# Patient Record
Sex: FEMALE | Race: WHITE | URBAN - METROPOLITAN AREA
[De-identification: names, ages, dates, MRNs, and addresses within clinical notes are randomized per-mention and may not be internally consistent; named-entity substitution may affect disease eponyms.]

---

## 2021-05-27 ENCOUNTER — TRANSCRIBE ORDER (OUTPATIENT)
Dept: SCHEDULING | Age: 36
End: 2021-05-27

## 2021-05-27 DIAGNOSIS — M75.102 LEFT ROTATOR CUFF TEAR: ICD-10-CM

## 2021-05-27 DIAGNOSIS — M25.512 LEFT SHOULDER PAIN: Primary | ICD-10-CM

## 2025-02-23 ENCOUNTER — HOSPITAL ENCOUNTER (EMERGENCY)
Facility: HOSPITAL | Age: 40
Discharge: HOME | End: 2025-02-23
Attending: EMERGENCY MEDICINE
Payer: COMMERCIAL

## 2025-02-23 VITALS
HEART RATE: 105 BPM | BODY MASS INDEX: 39.68 KG/M2 | OXYGEN SATURATION: 96 % | TEMPERATURE: 98.7 F | WEIGHT: 293 LBS | SYSTOLIC BLOOD PRESSURE: 125 MMHG | RESPIRATION RATE: 20 BRPM | HEIGHT: 72 IN | DIASTOLIC BLOOD PRESSURE: 87 MMHG

## 2025-02-23 DIAGNOSIS — J10.1 INFLUENZA A: Primary | ICD-10-CM

## 2025-02-23 DIAGNOSIS — J06.9 URI WITH COUGH AND CONGESTION: ICD-10-CM

## 2025-02-23 DIAGNOSIS — B97.89 VIRAL MYALGIA: ICD-10-CM

## 2025-02-23 DIAGNOSIS — M79.10 VIRAL MYALGIA: ICD-10-CM

## 2025-02-23 LAB
FLUAV RNA SPEC QL NAA+PROBE: DETECTED
FLUBV RNA SPEC QL NAA+PROBE: NOT DETECTED
SARS-COV-2 RNA RESP QL NAA+PROBE: NOT DETECTED
SOURCE: ABNORMAL

## 2025-02-23 PROCEDURE — 99283 EMERGENCY DEPT VISIT LOW MDM: CPT

## 2025-02-23 PROCEDURE — 87636 SARSCOV2 & INF A&B AMP PRB: CPT

## 2025-02-23 RX ORDER — OXYMETAZOLINE HYDROCHLORIDE 0.05 G/100ML
2 SPRAY NASAL 2 TIMES DAILY
Qty: 20 ML | Refills: 0 | Status: SHIPPED | OUTPATIENT
Start: 2025-02-23 | End: 2025-02-26

## 2025-02-23 RX ORDER — DEXTROMETHORPHAN HBR, GUAIFENESIN 60; 1200 MG/1; MG/1
1 TABLET, EXTENDED RELEASE ORAL EVERY 12 HOURS PRN
Qty: 28 TABLET | Refills: 0 | Status: SHIPPED | OUTPATIENT
Start: 2025-02-23

## 2025-02-23 RX ORDER — ACETAMINOPHEN 500 MG
1000 TABLET ORAL EVERY 6 HOURS PRN
Qty: 40 TABLET | Refills: 0 | Status: SHIPPED | OUTPATIENT
Start: 2025-02-23

## 2025-02-23 RX ORDER — IBUPROFEN 800 MG/1
800 TABLET, FILM COATED ORAL EVERY 6 HOURS PRN
Qty: 21 TABLET | Refills: 0 | Status: SHIPPED | OUTPATIENT
Start: 2025-02-23

## 2025-02-23 ASSESSMENT — LIFESTYLE VARIABLES
HOW MANY STANDARD DRINKS CONTAINING ALCOHOL DO YOU HAVE ON A TYPICAL DAY: PATIENT DOES NOT DRINK
HOW OFTEN DO YOU HAVE A DRINK CONTAINING ALCOHOL: NEVER

## 2025-02-23 ASSESSMENT — PAIN SCALES - GENERAL: PAINLEVEL_OUTOF10: 0

## 2025-02-23 ASSESSMENT — PAIN - FUNCTIONAL ASSESSMENT: PAIN_FUNCTIONAL_ASSESSMENT: 0-10

## 2025-02-23 NOTE — ED PROVIDER NOTES
[place] [facility]  EMERGENCY DEPARTMENT ENCOUNTER      Pt Name: Annika Willoughby  MRN: [id number]  Birthdate 1985  Date of evaluation: 2/23/2025  Provider: Gaudencio Cornejo MD    CHIEF COMPLAINT       Chief Complaint   Patient presents with    Shortness of Breath    Cough       ALLERGIES     Patient has no known allergies.    ENCOUNTER     HISTORY OF PRESENT ILLNESS:    Miss Willoughby is a 39-year-old female who presented to the Emergency Department with difficulty breathing, congestion in both the nose and chest, and a cough. The history was provided by the patient herself. Her symptoms began approximately 3 days ago, initially feeling unwell on Friday. She tested positive for Influenza A and denies having a fever. Miss Willoughby has no history of receiving the flu vaccine. She has been using Afrin nasal spray twice a day for three days, Mucinex DM for her cough, and alternating Tylenol and Ibuprofen every three hours for body aches and fever.    PHYSICAL EXAM:    - General Appearance: Alert and oriented, no acute distress.    - Respiratory: No wheezes bilaterally, no increased work of breathing.    - Cardiovascular: Heart rate normal, no murmurs, rubs, or gallops.    - HEENT: Congestion noted in nasal passages.    - Neurological: Alert and oriented, speech normal.    - Skin: Warm and dry.    SUMMARY:    Miss Willoughby, a 39-year-old female, presented with difficulty breathing, congestion, and cough. She tested positive for Influenza A. Examination revealed no wheezes or increased work of breathing. The patient was advised to wear a mask and avoid contact with others due to contagiousness. Symptoms are expected to last about 7 days, with the worst in the first 4-5 days. Tamiflu was not prescribed as she is outside the 48-hour window for effective treatment. If symptoms do not improve by the end of the week, further evaluation for potential secondary infections like pneumonia is recommended. Discharged with

## 2025-02-23 NOTE — ED TRIAGE NOTES
Presents ambulatory from home for c/o cough and SOB x 2 days.  Denies N/V/D. Has been taking OTC decongestants and cough suppressants with no relief.